# Patient Record
(demographics unavailable — no encounter records)

---

## 2025-01-13 NOTE — PHYSICAL EXAM
[No Acute Distress] : no acute distress [Normal Oropharynx] : normal oropharynx [II] : Mallampati Class: II [Normal Appearance] : normal appearance [No Neck Mass] : no neck mass [Normal Rate/Rhythm] : normal rate/rhythm [Normal S1, S2] : normal s1, s2 [No Murmurs] : no murmurs [No Resp Distress] : no resp distress [No Abnormalities] : no abnormalities [Benign] : benign [Normal Gait] : normal gait [No Clubbing] : no clubbing [No Cyanosis] : no cyanosis [No Edema] : no edema [FROM] : FROM [Normal Color/ Pigmentation] : normal color/ pigmentation [No Focal Deficits] : no focal deficits [Oriented x3] : oriented x3 [Normal Affect] : normal affect [TextBox_2] : thin  [TextBox_68] : I:E ratio 1:3; mild expiratory wheeze B/L, worsened in supine position.

## 2025-01-13 NOTE — REASON FOR VISIT
[Follow-Up] : a follow-up visit [TextBox_44] : SOB, severe persistent asthma, ?TBM,  allergy, GERD, chronic cough, ?SRINI \par

## 2025-01-13 NOTE — ADDENDUM
[FreeTextEntry1] : Documented by Preston Castro acting as a scribe for Dr. Cristobal Song on 01/13/2025. All medical record entries made by the Scribe were at my, Dr. Cristobal Song's, direction and personally dictated by me on 01/13/2025. I have reviewed the chart and agree that the record accurately reflects my personal performance of the history, physical exam, assessment and plan. I have also personally directed, reviewed, and agree with the discharge instructions.

## 2025-01-13 NOTE — PROCEDURE
[FreeTextEntry1] : CXR (1/5/2025) reveals fairly mild ill-defined airspace disease at the lower lung fields can be correlated for any subtle inflammatory/infectious process  CXR (01/13/2025) reveals a normal sized heart; no evidence of infiltrate or effusion--a normal appearing chest radiograph

## 2025-01-13 NOTE — HISTORY OF PRESENT ILLNESS
[TextBox_4] : Ms. DONATO is a 37 year old female presenting to the office today for a follow-up pulmonary evaluation. Her chief complaint is  -she notes s/p sick the week before Lady -she notes before sickness she felt fine -she notes doing overtime before getting sick and believes the cold air  -she notes s/p Prednisone course (40 mg x 3 days) which didn't help her -she notes being put on another Prednisone course -she notes s/p [facility] and was put on BiPAP, discharged Wednesday night -she notes feeling little progression in how she feels  -she notes now she is on 30 mg of Prednisone -she notes using Spiriva, Symbicort, Albuterol, nebulizer -she notes taking her Famotidine regularly, but she felt her heartburn before she was sick -she notes sinuses are quiet -she notes feeling completely normal besides her breathing  -she notes feeling fatigued  -she denies any headaches, nausea, emesis, fever, chills, sweats, chest pain, chest pressure, coughing, wheezing, palpitations, diarrhea, constipation, dysphagia, vertigo, arthralgias, myalgias, leg swelling, itchy eyes, itchy ears, reflux, or sour taste in the mouth.

## 2025-01-13 NOTE — ASSESSMENT
[FreeTextEntry1] : Ms. Husain is a 37 year old female with a history of endometriosis, fibroadenoma of the right breast, asthma, seasonal allergies, PCOS- SOB, severe persistent asthma, no TBM, allergy, GERD, ?SRINI - active asthma s/p resp fail [facility]/ ?environmental vs infection  Her shortness of breath is multifactorial due to: -poor mechanics of breathing -out of shape - pulmonary disease  - severe persistent asthma -?cardiac disease  problem 1: severe persistent Asthma / ?Steroid dependent asthma -  active s/p TOP -add Symbicort 160 2 inhalations BID -add Spiriva at 2 inhalations QAM -add Xopenex 0.63 via nebulizer q6H prn -add Xopenex via inhaler 2 puffs q6h PRN -add Singulair 10 mg QHS - s/p blood work: Alpha1 antitrypsin level- Barash -s/p Prednisone 20 mg x 7 days, 10 mg x 7 days -add Prednisone 30 mg x 5 days, 20 mg x 5 days, 10 mg x 5 days  - Asthma is believed to be caused by inherited (genetic) and environmental factor, but its exact cause is unknown. Asthma may be triggered by allergens, lung infections, or irritants in the air. Asthma triggers are different for each person. -Inhaler technique reviewed as well as oral hygiene techniques reviewed with patient. Avoidance of cold air, extremes of temperature, rescue inhaler should be used before exercise. Order of medication reviewed with patient. Recommended use of a cool mist humidifier in the bedroom.  Problem 1A: biologics Rx -initiate Tezspire setup over other -Type 2 asthma, which accounts for approximately 70% of all cases, is indicated by high eosinophil counts and elevated levels of T2 cytokines. Six biologic agents are FDA-approved to treat moderate-to-severe asthma by targeting various cytokines and cell surface receptors involved in the inflammatory process in asthma. Several biologic therapies are also indicated for other inflammatory conditions marked by high eosinophils. Efficacy of biologic therapy should be assessed after 4 to 6 months of treatment.  Problem 1B: ?acute bronchitis -complete blood work: mycoplasma, chlamydia pneumoniae, and pertussis titers -add Zithromax 500 x 7 days -Patient has a clinical scenario most consistent with acute bronchitis, the etiology of which is unknown, but empiric antibiotics are indicated. Hydration and mucolytics (including Mucinex, Robitussin, etc.) Cough controlling agents will be needed.  problem 2: no TBM - s/p Dynamic CT - c/w inflammation (mucus plugging) Tracheomalacia is usually acquired in adults and common causes include damage by tracheostomy or endotracheal intubation damaging the tracheal cartilage with increase risk with multiple intubations, prolonged intubation, and concurrent high dose steroid therapy; external chest wall trauma and surgery; chronic compression of the trachea by benign etiologies (eg, benign mediastinal goiter) or malignancy; relapsing polychondritis; or recurrent infection. Tracheomalacia can be asymptomatic, however signs or symptoms can develop as the severity of the airway narrowing progresses with major symptoms include dyspnea, cough, and sputum retention. Other symptoms include severe paroxysms of coughing, wheezing or stridor, barking cough and may be exacerbated by forced expiration, cough, and valsalva maneuver. Tracheomalacia is diagnosed by a bronchoscopic visualization of dynamic airway collapse on dynamic chest CT. Therapy is warranted in symptomatic patients with severe tracheomalacia and includes surgical repair as tracheobronchoplasty. The patient was referred to Dr. Des Patten and Dr. Gamez at [facility] for a surgical consult.  Problem 3: Allergies / Sinus - quiet -continue Allegra 180 mg QAM -continue Xyzal 5 mg QHS -add Olopatadine 0.6% 1 sniff BID - script given for blood work: asthma profile, food IgE panel, eosinophil level, IgE level, Vitamin D level ([facility])- [place] - Environmental measures for allergies were encouraged including mattress and pillow cover, air purifier, and environmental controls.  Problem 4: Reflux / GERD -continue Protonix 40 mg QAM, pre-breakfast -continue Pepcid 40 mg QHS -recommended Reflux Gourmet  -Rule of 2s: avoid eating too much, eating too late, eating too spicy, eating two hours before bed. -Things to avoid including overeating, spicy foods, tight clothing, eating within three hours of bed, this list is not all inclusive. -For treatment of reflux, possible options discussed including diet control, H2 blockers, PPIs, as well as coating motility agents discussed as treatment options. Timing of meals and proximity of last meal to sleep were discussed. If symptoms persist, a formal gastrointestinal evaluation is needed.   Problem 5: chronic cough -Add Amitriptyline 10 mg to take QHS Any cough greater than three weeks duration-differential diagnosis includes-asthma, upper airway cough syndrome, post nasal drip syndrome, gastroesophageal reflux, laryngopharyngeal reflux, cardiac disease (congestive heart failure, medicines, effects, etc), medication effects (b-blockers, ace inhibitors, ARBs, glaucoma meds, etc.), smoking, infectious, multifactorial, etc.   Problem 6: Poor sleep / ?OSAS (risk factors: fatigue, elevated Mallampati class) - get home sleep study -Sleep apnea is associated with adverse clinical consequences which can affect most organ systems. Cardiovascular disease risk includes arrhythmias, atrial fibrillation, hypertension, coronary artery disease, and stroke. Metabolic disorders include diabetes type 2, non-alcoholic fatty liver disease. Mood disorder especially depression; and cognitive decline especially in the elderly. Associations with chronic reflux/Costello's esophagus some but not all inclusive. -Reasons include arousal consistent with hypopnea; respiratory events most prominent in REM sleep or supine position; therefore sleep staging and body position are important for accurate diagnosis and estimation of AHI.  problem 7 : cardiac disease -recommended to continue to follow up with Cardiologist if needed  problem 8 : poor breathing mechanics -recommended Linda Lange and Butbeatriz breathing techniques -Proper breathing techniques were reviewed with an emphasis of exhalation. Patient instructed to breath in for 1 second and out for four seconds. Patient was encouraged to not talk while walking.  problem 9: out of shape -Weight loss, exercise, and diet control were discussed and are highly encouraged. Treatment options are given such as, aqua therapy, and contacting a nutritionist. Recommended to use the elliptical, stationary bike, less use of treadmill.  problem 10 : health maintenance -recommended yearly flu shot after October 15- refused 2023 -recommended strep pneumonia vaccines: Prevnar-13 vaccine, followed by Pneumo vaccine 23 one year following after 65 years old. -recommended early intervention for Upper Respiratory Infections (URIs) -recommended regular osteoporosis evaluations -recommended early dermatological evaluations -recommended after the age of 50 to the age of 70, colonoscopy every 5 years  F/P in 4-6 months She is encouraged to call with any changes, concerns, or questions.

## 2025-02-24 NOTE — ASSESSMENT
[FreeTextEntry1] : Ms. Husain is a 37 year old female with a history of endometriosis, fibroadenoma of the right breast, asthma, seasonal allergies, PCOS- SOB, severe persistent asthma, no TBM, allergy, GERD, ?SRINI - active asthma s/p resp fail [facility]/ ?environmental vs infection- improved  Her shortness of breath is multifactorial due to: -poor mechanics of breathing -out of shape - pulmonary disease  - severe persistent asthma -?cardiac disease  problem 1: severe persistent Asthma / ?Steroid dependent asthma -  active s/p TOP -continue Symbicort 160 2 inhalations BID -continue Spiriva at 2 inhalations QAM -continue Xopenex 0.63 via nebulizer q6H prn -continue Xopenex via inhaler 2 puffs q6h PRN -add Singulair 10 mg QHS - s/p blood work: Alpha1 antitrypsin level- Barash -s/p Prednisone 20 mg x 7 days, 10 mg x 7 days -s/p Prednisone 30 mg x 5 days, 20 mg x 5 days, 10 mg x 5 days 1/2025  - Asthma is believed to be caused by inherited (genetic) and environmental factor, but its exact cause is unknown. Asthma may be triggered by allergens, lung infections, or irritants in the air. Asthma triggers are different for each person. -Inhaler technique reviewed as well as oral hygiene techniques reviewed with patient. Avoidance of cold air, extremes of temperature, rescue inhaler should be used before exercise. Order of medication reviewed with patient. Recommended use of a cool mist humidifier in the bedroom.  Problem 1A: biologics Rx -initiate Tezspire setup over other- pending -Type 2 asthma, which accounts for approximately 70% of all cases, is indicated by high eosinophil counts and elevated levels of T2 cytokines. Six biologic agents are FDA-approved to treat moderate-to-severe asthma by targeting various cytokines and cell surface receptors involved in the inflammatory process in asthma. Several biologic therapies are also indicated for other inflammatory conditions marked by high eosinophils. Efficacy of biologic therapy should be assessed after 4 to 6 months of treatment.  Problem 1B: ?acute bronchitis (resolved) -complete blood work: mycoplasma, chlamydia pneumoniae, and pertussis titers ([place]) -add Zithromax 500 x 7 days -Patient has a clinical scenario most consistent with acute bronchitis, the etiology of which is unknown, but empiric antibiotics are indicated. Hydration and mucolytics (including Mucinex, Robitussin, etc.) Cough controlling agents will be needed.  problem 2: no TBM - s/p Dynamic CT - c/w inflammation (mucus plugging) Tracheomalacia is usually acquired in adults and common causes include damage by tracheostomy or endotracheal intubation damaging the tracheal cartilage with increase risk with multiple intubations, prolonged intubation, and concurrent high dose steroid therapy; external chest wall trauma and surgery; chronic compression of the trachea by benign etiologies (eg, benign mediastinal goiter) or malignancy; relapsing polychondritis; or recurrent infection. Tracheomalacia can be asymptomatic, however signs or symptoms can develop as the severity of the airway narrowing progresses with major symptoms include dyspnea, cough, and sputum retention. Other symptoms include severe paroxysms of coughing, wheezing or stridor, barking cough and may be exacerbated by forced expiration, cough, and valsalva maneuver. Tracheomalacia is diagnosed by a bronchoscopic visualization of dynamic airway collapse on dynamic chest CT. Therapy is warranted in symptomatic patients with severe tracheomalacia and includes surgical repair as tracheobronchoplasty. The patient was referred to Dr. Des Patten and Dr. Gamez at [facility] for a surgical consult.  Problem 3: Allergies / Sinus - quiet -continue Allegra 180 mg QAM -continue Xyzal 5 mg QHS -add Olopatadine 0.6% 1 sniff BID - script given for blood work: asthma profile, food IgE panel, eosinophil level, IgE level, Vitamin D level ([facility])- [place] - Environmental measures for allergies were encouraged including mattress and pillow cover, air purifier, and environmental controls.  Problem 4: Reflux / GERD -continue Protonix 40 mg QAM, pre-breakfast -continue Pepcid 40 mg QHS -recommended Reflux Gourmet  -Rule of 2s: avoid eating too much, eating too late, eating too spicy, eating two hours before bed. -Things to avoid including overeating, spicy foods, tight clothing, eating within three hours of bed, this list is not all inclusive. -For treatment of reflux, possible options discussed including diet control, H2 blockers, PPIs, as well as coating motility agents discussed as treatment options. Timing of meals and proximity of last meal to sleep were discussed. If symptoms persist, a formal gastrointestinal evaluation is needed.   Problem 5: chronic cough -Add Amitriptyline 10 mg to take QHS Any cough greater than three weeks duration-differential diagnosis includes-asthma, upper airway cough syndrome, post nasal drip syndrome, gastroesophageal reflux, laryngopharyngeal reflux, cardiac disease (congestive heart failure, medicines, effects, etc), medication effects (b-blockers, ace inhibitors, ARBs, glaucoma meds, etc.), smoking, infectious, multifactorial, etc.   Problem 6: Poor sleep / ?OSAS (risk factors: fatigue, elevated Mallampati class) - get home sleep study -Sleep apnea is associated with adverse clinical consequences which can affect most organ systems. Cardiovascular disease risk includes arrhythmias, atrial fibrillation, hypertension, coronary artery disease, and stroke. Metabolic disorders include diabetes type 2, non-alcoholic fatty liver disease. Mood disorder especially depression; and cognitive decline especially in the elderly. Associations with chronic reflux/Costello's esophagus some but not all inclusive. -Reasons include arousal consistent with hypopnea; respiratory events most prominent in REM sleep or supine position; therefore sleep staging and body position are important for accurate diagnosis and estimation of AHI.  problem 7 : cardiac disease -recommended to continue to follow up with Cardiologist if needed  problem 8 : poor breathing mechanics -recommended Linda Lange and Butbeatriz breathing techniques -Proper breathing techniques were reviewed with an emphasis of exhalation. Patient instructed to breath in for 1 second and out for four seconds. Patient was encouraged to not talk while walking.  problem 9: out of shape -Weight loss, exercise, and diet control were discussed and are highly encouraged. Treatment options are given such as, aqua therapy, and contacting a nutritionist. Recommended to use the elliptical, stationary bike, less use of treadmill.  problem 10 : health maintenance -recommended yearly flu shot after October 15- refused 2024 -recommended strep pneumonia vaccines: Prevnar-13 vaccine, followed by Pneumo vaccine 23 one year following after 65 years old. -recommended early intervention for Upper Respiratory Infections (URIs) -recommended regular osteoporosis evaluations -recommended early dermatological evaluations -recommended after the age of 50 to the age of 70, colonoscopy every 5 years  F/P in 4-6 months She is encouraged to call with any changes, concerns, or questions.

## 2025-02-24 NOTE — HISTORY OF PRESENT ILLNESS
[TextBox_4] : Ms. DONATO is a 37 year old female presenting to the office today for a follow-up pulmonary evaluation. Her chief complaint is  -she notes not doing well in the cold weather -she notes still having a residual cough -she notes bowels are regular -she notes good quality of sleep -she notes sleeping for 5-6 hours -she notes exercising (skiing) -she notes her main complaint is wanting to be able to run without becoming out of breath  -she denies any headaches, nausea, emesis, fever, chills, sweats, chest pain, chest pressure, coughing, wheezing, palpitations, diarrhea, constipation, dysphagia, vertigo, arthralgias, myalgias, leg swelling, itchy eyes, itchy ears, heartburn, reflux, or sour taste in the mouth.

## 2025-02-24 NOTE — PROCEDURE
[FreeTextEntry1] : Full PFT reveals normal flows; FEV1 was 2.59 L which is 92% of predicted; normal lung volumes; normal diffusion at 14.17, which is 67% of predicted; normal flow volume loop.   PFTs were performed to evaluate for SOB

## 2025-02-24 NOTE — ADDENDUM
[FreeTextEntry1] : Documented by Preston Castro acting as a scribe for Dr. Cristobal Song on 02/24/2025. All medical record entries made by the Scribe were at my, Dr. Cristobal Song's, direction and personally dictated by me on 02/24/2025. I have reviewed the chart and agree that the record accurately reflects my personal performance of the history, physical exam, assessment and plan. I have also personally directed, reviewed, and agree with the discharge instructions.